# Patient Record
Sex: FEMALE | NOT HISPANIC OR LATINO | Employment: FULL TIME | URBAN - METROPOLITAN AREA
[De-identification: names, ages, dates, MRNs, and addresses within clinical notes are randomized per-mention and may not be internally consistent; named-entity substitution may affect disease eponyms.]

---

## 2021-02-02 ENCOUNTER — OFFICE VISIT (OUTPATIENT)
Dept: OBSTETRICS AND GYNECOLOGY | Facility: CLINIC | Age: 51
End: 2021-02-02

## 2021-02-02 VITALS
DIASTOLIC BLOOD PRESSURE: 98 MMHG | HEIGHT: 68 IN | WEIGHT: 177.8 LBS | HEART RATE: 78 BPM | SYSTOLIC BLOOD PRESSURE: 137 MMHG | BODY MASS INDEX: 26.95 KG/M2

## 2021-02-02 DIAGNOSIS — Z01.419 WELL WOMAN EXAM WITH ROUTINE GYNECOLOGICAL EXAM: Primary | ICD-10-CM

## 2021-02-02 PROCEDURE — 99396 PREV VISIT EST AGE 40-64: CPT | Performed by: OBSTETRICS & GYNECOLOGY

## 2021-02-02 RX ORDER — NORETHINDRONE ACETATE AND ETHINYL ESTRADIOL, ETHINYL ESTRADIOL AND FERROUS FUMARATE 1MG-10(24)
KIT ORAL
COMMUNITY
Start: 2020-12-13

## 2021-02-02 RX ORDER — ESTRADIOL 10 UG/1
INSERT VAGINAL
COMMUNITY
Start: 2020-10-11

## 2021-02-02 RX ORDER — ESCITALOPRAM OXALATE 10 MG/1
TABLET ORAL
COMMUNITY
Start: 2020-12-26

## 2021-02-03 NOTE — PROGRESS NOTES
"[facility]  [address], [address]  [place], [place] [address]  Phone: [phone] / Fax:  [phone]      2021    [address] [place] [address]    Provider, No Known    Chief Complaint   Patient presents with   • Gynecologic Exam     Patient is here for annual exam. Last pap 2016; Mx 2021       Claribel Mcghee is here for annual gynecologic exam.  HPI - Patient on OCP with some vasomotor symptoms.  She is planning to check FSH and estradiol in pill-free week.  Patient did mammogram last week with negative results.  Last pap was in  and was normal.    Past Medical History:   Diagnosis Date   • H/O mammogram     1 yr, nl per pt.       Past Surgical History:   Procedure Laterality Date   • KNEE SURGERY Bilateral     corrective,        No Known Allergies    Social History     Socioeconomic History   • Marital status:      Spouse name: Not on file   • Number of children: Not on file   • Years of education: Not on file   • Highest education level: Not on file   Tobacco Use   • Smoking status: Never Smoker   • Smokeless tobacco: Never Used   Substance and Sexual Activity   • Alcohol use: Yes   • Drug use: No   • Sexual activity: Defer     Birth control/protection: OCP       Family History   Problem Relation Age of Onset   • Heart disease Father    • Hypertension Father    • Osteoporosis Mother    • Hypertension Mother    • Breast cancer Neg Hx    • Ovarian cancer Neg Hx    • Uterine cancer Neg Hx    • Colon cancer Neg Hx        Patient's last menstrual period was 2021 (approximate).    OB History        1    Para   1    Term   1            AB        Living   1       SAB        TAB        Ectopic        Molar        Multiple        Live Births                    Vitals:    21 1317 21 1455   BP: 140/94 137/98   Pulse: 78    Weight: 80.6 kg (177 lb 12.8 oz)    Height: 172.7 cm (68\")        Physical Exam  Constitutional:       Appearance: " She is well-developed.   Genitourinary:      Pelvic exam was performed with patient in the lithotomy position.      Vulva, urethra, vagina and uterus normal.      No cervical motion tenderness or lesion.      Right adnexa not tender or full.      Left adnexa not tender or full.   HENT:      Right Ear: External ear normal.      Left Ear: External ear normal.      Nose: Nose normal.   Eyes:      Conjunctiva/sclera: Conjunctivae normal.   Neck:      Musculoskeletal: Normal range of motion and neck supple.      Thyroid: No thyromegaly.   Cardiovascular:      Rate and Rhythm: Normal rate and regular rhythm.      Heart sounds: Normal heart sounds.   Pulmonary:      Effort: Pulmonary effort is normal.      Breath sounds: No stridor. No wheezing.   Chest:      Breasts:         Right: No mass or nipple discharge.         Left: No mass or nipple discharge.   Abdominal:      Palpations: Abdomen is soft. There is no mass.      Tenderness: There is no guarding or rebound.   Musculoskeletal: Normal range of motion.   Neurological:      Mental Status: She is alert.      Coordination: Coordination normal.   Skin:     General: Skin is warm and dry.   Psychiatric:         Behavior: Behavior normal.         Thought Content: Thought content normal.         Judgment: Judgment normal.   Vitals signs reviewed.         Diagnoses and all orders for this visit:    1. Well woman exam with routine gynecological exam (Primary)  -     Discussed importance of regular screening, mammogram and colonoscopy at age 51.  -     IGP, Aptima HPV, Rfx 16 / 18,45  -     Slightly elevated BP noted.  Patient is planning to see PCP.        Jacky Hyde MD

## 2021-02-04 LAB
CYTOLOGIST CVX/VAG CYTO: NORMAL
CYTOLOGY CVX/VAG DOC CYTO: NORMAL
CYTOLOGY CVX/VAG DOC THIN PREP: NORMAL
DX ICD CODE: NORMAL
HIV 1 & 2 AB SER-IMP: NORMAL
HPV I/H RISK 4 DNA CVX QL PROBE+SIG AMP: NEGATIVE
OTHER STN SPEC: NORMAL
STAT OF ADQ CVX/VAG CYTO-IMP: NORMAL

## 2025-07-15 ENCOUNTER — OFFICE VISIT (OUTPATIENT)
Dept: OBSTETRICS AND GYNECOLOGY | Facility: CLINIC | Age: 55
End: 2025-07-15
Payer: COMMERCIAL

## 2025-07-15 VITALS
HEIGHT: 68 IN | BODY MASS INDEX: 27.28 KG/M2 | WEIGHT: 180 LBS | SYSTOLIC BLOOD PRESSURE: 139 MMHG | DIASTOLIC BLOOD PRESSURE: 85 MMHG

## 2025-07-15 DIAGNOSIS — Z01.419 ENCOUNTER FOR GYNECOLOGICAL EXAMINATION: Primary | ICD-10-CM

## 2025-07-15 RX ORDER — ESTRADIOL 0.1 MG/G
1 CREAM VAGINAL
COMMUNITY
Start: 2025-03-20

## 2025-07-15 RX ORDER — ESCITALOPRAM OXALATE 10 MG/1
10 TABLET ORAL DAILY
COMMUNITY
Start: 2025-03-20

## 2025-07-15 RX ORDER — LANSOPRAZOLE 30 MG/1
CAPSULE, DELAYED RELEASE ORAL
COMMUNITY
Start: 2025-03-20

## 2025-07-17 LAB
CYTOLOGIST CVX/VAG CYTO: NORMAL
CYTOLOGY CVX/VAG DOC CYTO: NORMAL
CYTOLOGY CVX/VAG DOC THIN PREP: NORMAL
DX ICD CODE: NORMAL
HPV I/H RISK 4 DNA CVX QL PROBE+SIG AMP: NEGATIVE
OTHER STN SPEC: NORMAL
SERVICE CMNT-IMP: NORMAL
STAT OF ADQ CVX/VAG CYTO-IMP: NORMAL

## 2025-07-17 NOTE — PROGRESS NOTES
[facility]  [address], [address]  [place] [address]  Phone: [phone] / Fax:  [phone]      07/15/2025    [address] [place] [address]    Provider, No Known    Chief Complaint   Patient presents with    Gynecologic Exam     Annual Exam, last pap 21 NL HPV (-). Mammogram 25 NL.. Colonoscopy 22, cologuard 21-Negative.  Patient is c/o hot flashes and night sweats. She is currently using Estradiol vaginal cream.       Claribel Mcghee is here for annual gynecologic exam.  HPI - Patient with normal pap 4 years ago.  HPV testing negative.  She had a negative mammogram 4-5 months ago.  Last colonoscopy was in  and was benign appearing.  Patient with mild menopausal symptoms including hot flashes/night sweats.  Patient uses estrogen vaginal cream for vaginal atrophy symptoms.    Past Medical History:   Diagnosis Date    GERD (gastroesophageal reflux disease)     H/O mammogram     1 yr, nl per pt.       Past Surgical History:   Procedure Laterality Date    KNEE SURGERY Bilateral     corrective,        No Known Allergies    Social History     Socioeconomic History    Marital status:    Tobacco Use    Smoking status: Never    Smokeless tobacco: Never   Vaping Use    Vaping status: Never Used   Substance and Sexual Activity    Alcohol use: Not Currently    Drug use: No    Sexual activity: Defer     Birth control/protection: OCP       Family History   Problem Relation Age of Onset    Heart disease Father     Hypertension Father     Osteoporosis Mother     Hypertension Mother     No Known Problems Daughter     Breast cancer Neg Hx     Ovarian cancer Neg Hx     Uterine cancer Neg Hx     Colon cancer Neg Hx        No LMP recorded. Patient is perimenopausal.    OB History          1    Para   1    Term   1            AB        Living   1         SAB        IAB        Ectopic        Molar        Multiple        Live Births               "      Vitals:    07/15/25 1433   BP: 139/85   Weight: 81.6 kg (180 lb)   Height: 172.7 cm (68\")       Physical Exam  Constitutional:       Appearance: Normal appearance. She is well-developed.   Genitourinary:      Bladder and urethral meatus normal.      Right Labia: No tenderness or lesions.     Left Labia: No tenderness or lesions.     No vaginal discharge or tenderness.        Right Adnexa: not tender and not full.     Left Adnexa: not tender and not full.     No cervical motion tenderness or lesion.      Uterus is not enlarged or tender.      No urethral tenderness or hypermobility present.   Breasts:     Right: No mass or nipple discharge.      Left: No mass or nipple discharge.   HENT:      Right Ear: External ear normal.      Left Ear: External ear normal.      Nose: Nose normal.   Eyes:      Conjunctiva/sclera: Conjunctivae normal.   Neck:      Thyroid: No thyromegaly.   Cardiovascular:      Rate and Rhythm: Normal rate and regular rhythm.      Heart sounds: Normal heart sounds.   Pulmonary:      Effort: Pulmonary effort is normal.      Breath sounds: No stridor. No wheezing.   Abdominal:      Palpations: Abdomen is soft.      Tenderness: There is no abdominal tenderness. There is no guarding or rebound.   Musculoskeletal:         General: Normal range of motion.      Cervical back: Normal range of motion and neck supple.   Neurological:      Mental Status: She is alert.      Coordination: Coordination normal.   Skin:     General: Skin is warm and dry.   Psychiatric:         Mood and Affect: Mood normal.         Behavior: Behavior normal.         Thought Content: Thought content normal.         Judgment: Judgment normal.   Vitals reviewed. Exam conducted with a chaperone present.         Diagnoses and all orders for this visit:    1. Encounter for gynecological examination (Primary)  -     IgP, Aptima HPV  -     Discussed regular screening and breast awareness.  -     Discussed treatment of vasomotor " symptoms with systemic estrogen and/or [place].  Patient to defer for now.        Jacky Hyde MD